# Patient Record
Sex: MALE | Race: OTHER | URBAN - METROPOLITAN AREA
[De-identification: names, ages, dates, MRNs, and addresses within clinical notes are randomized per-mention and may not be internally consistent; named-entity substitution may affect disease eponyms.]

---

## 2023-12-12 ENCOUNTER — EMERGENCY (EMERGENCY)
Facility: HOSPITAL | Age: 28
LOS: 1 days | Discharge: HOME | End: 2023-12-12
Attending: STUDENT IN AN ORGANIZED HEALTH CARE EDUCATION/TRAINING PROGRAM | Admitting: STUDENT IN AN ORGANIZED HEALTH CARE EDUCATION/TRAINING PROGRAM
Payer: COMMERCIAL

## 2023-12-12 VITALS
OXYGEN SATURATION: 97 % | RESPIRATION RATE: 18 BRPM | DIASTOLIC BLOOD PRESSURE: 93 MMHG | TEMPERATURE: 98 F | SYSTOLIC BLOOD PRESSURE: 162 MMHG | HEIGHT: 68 IN | HEART RATE: 74 BPM | WEIGHT: 169.98 LBS

## 2023-12-12 VITALS
HEART RATE: 66 BPM | DIASTOLIC BLOOD PRESSURE: 89 MMHG | OXYGEN SATURATION: 96 % | SYSTOLIC BLOOD PRESSURE: 140 MMHG | TEMPERATURE: 98 F | RESPIRATION RATE: 18 BRPM

## 2023-12-12 DIAGNOSIS — Z87.2 PERSONAL HISTORY OF DISEASES OF THE SKIN AND SUBCUTANEOUS TISSUE: ICD-10-CM

## 2023-12-12 DIAGNOSIS — R41.82 ALTERED MENTAL STATUS, UNSPECIFIED: ICD-10-CM

## 2023-12-12 DIAGNOSIS — R22.0 LOCALIZED SWELLING, MASS AND LUMP, HEAD: ICD-10-CM
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
## 2023-12-12 LAB
ALBUMIN SERPL ELPH-MCNC: 4.6 G/DL — SIGNIFICANT CHANGE UP (ref 3.3–5)
ALBUMIN SERPL ELPH-MCNC: 4.6 G/DL — SIGNIFICANT CHANGE UP (ref 3.3–5)
ALP SERPL-CCNC: 72 U/L — SIGNIFICANT CHANGE UP (ref 40–120)
ALP SERPL-CCNC: 72 U/L — SIGNIFICANT CHANGE UP (ref 40–120)
ALT FLD-CCNC: 64 U/L — HIGH (ref 10–45)
ALT FLD-CCNC: 64 U/L — HIGH (ref 10–45)
ANION GAP SERPL CALC-SCNC: 10 MMOL/L — SIGNIFICANT CHANGE UP (ref 5–17)
ANION GAP SERPL CALC-SCNC: 10 MMOL/L — SIGNIFICANT CHANGE UP (ref 5–17)
APAP SERPL-MCNC: <5 UG/ML — LOW (ref 10–30)
APAP SERPL-MCNC: <5 UG/ML — LOW (ref 10–30)
APTT BLD: 35.5 SEC — SIGNIFICANT CHANGE UP (ref 24.5–35.6)
APTT BLD: 35.5 SEC — SIGNIFICANT CHANGE UP (ref 24.5–35.6)
AST SERPL-CCNC: 39 U/L — SIGNIFICANT CHANGE UP (ref 10–40)
AST SERPL-CCNC: 39 U/L — SIGNIFICANT CHANGE UP (ref 10–40)
BASOPHILS # BLD AUTO: 0.03 K/UL — SIGNIFICANT CHANGE UP (ref 0–0.2)
BASOPHILS # BLD AUTO: 0.03 K/UL — SIGNIFICANT CHANGE UP (ref 0–0.2)
BASOPHILS NFR BLD AUTO: 0.5 % — SIGNIFICANT CHANGE UP (ref 0–2)
BASOPHILS NFR BLD AUTO: 0.5 % — SIGNIFICANT CHANGE UP (ref 0–2)
BILIRUB SERPL-MCNC: 0.4 MG/DL — SIGNIFICANT CHANGE UP (ref 0.2–1.2)
BILIRUB SERPL-MCNC: 0.4 MG/DL — SIGNIFICANT CHANGE UP (ref 0.2–1.2)
BUN SERPL-MCNC: 16 MG/DL — SIGNIFICANT CHANGE UP (ref 7–23)
BUN SERPL-MCNC: 16 MG/DL — SIGNIFICANT CHANGE UP (ref 7–23)
CALCIUM SERPL-MCNC: 9.4 MG/DL — SIGNIFICANT CHANGE UP (ref 8.4–10.5)
CALCIUM SERPL-MCNC: 9.4 MG/DL — SIGNIFICANT CHANGE UP (ref 8.4–10.5)
CHLORIDE SERPL-SCNC: 105 MMOL/L — SIGNIFICANT CHANGE UP (ref 96–108)
CHLORIDE SERPL-SCNC: 105 MMOL/L — SIGNIFICANT CHANGE UP (ref 96–108)
CO2 SERPL-SCNC: 24 MMOL/L — SIGNIFICANT CHANGE UP (ref 22–31)
CO2 SERPL-SCNC: 24 MMOL/L — SIGNIFICANT CHANGE UP (ref 22–31)
CREAT SERPL-MCNC: 0.99 MG/DL — SIGNIFICANT CHANGE UP (ref 0.5–1.3)
CREAT SERPL-MCNC: 0.99 MG/DL — SIGNIFICANT CHANGE UP (ref 0.5–1.3)
EGFR: 106 ML/MIN/1.73M2 — SIGNIFICANT CHANGE UP
EGFR: 106 ML/MIN/1.73M2 — SIGNIFICANT CHANGE UP
EOSINOPHIL # BLD AUTO: 0.16 K/UL — SIGNIFICANT CHANGE UP (ref 0–0.5)
EOSINOPHIL # BLD AUTO: 0.16 K/UL — SIGNIFICANT CHANGE UP (ref 0–0.5)
EOSINOPHIL NFR BLD AUTO: 2.6 % — SIGNIFICANT CHANGE UP (ref 0–6)
EOSINOPHIL NFR BLD AUTO: 2.6 % — SIGNIFICANT CHANGE UP (ref 0–6)
ETHANOL SERPL-MCNC: <10 MG/DL — SIGNIFICANT CHANGE UP (ref 0–10)
ETHANOL SERPL-MCNC: <10 MG/DL — SIGNIFICANT CHANGE UP (ref 0–10)
GLUCOSE SERPL-MCNC: 103 MG/DL — HIGH (ref 70–99)
GLUCOSE SERPL-MCNC: 103 MG/DL — HIGH (ref 70–99)
HCT VFR BLD CALC: 44.8 % — SIGNIFICANT CHANGE UP (ref 39–50)
HCT VFR BLD CALC: 44.8 % — SIGNIFICANT CHANGE UP (ref 39–50)
HGB BLD-MCNC: 15.5 G/DL — SIGNIFICANT CHANGE UP (ref 13–17)
HGB BLD-MCNC: 15.5 G/DL — SIGNIFICANT CHANGE UP (ref 13–17)
IMM GRANULOCYTES NFR BLD AUTO: 0.3 % — SIGNIFICANT CHANGE UP (ref 0–0.9)
IMM GRANULOCYTES NFR BLD AUTO: 0.3 % — SIGNIFICANT CHANGE UP (ref 0–0.9)
INR BLD: 1.02 — SIGNIFICANT CHANGE UP (ref 0.85–1.18)
INR BLD: 1.02 — SIGNIFICANT CHANGE UP (ref 0.85–1.18)
LYMPHOCYTES # BLD AUTO: 1.56 K/UL — SIGNIFICANT CHANGE UP (ref 1–3.3)
LYMPHOCYTES # BLD AUTO: 1.56 K/UL — SIGNIFICANT CHANGE UP (ref 1–3.3)
LYMPHOCYTES # BLD AUTO: 25.5 % — SIGNIFICANT CHANGE UP (ref 13–44)
LYMPHOCYTES # BLD AUTO: 25.5 % — SIGNIFICANT CHANGE UP (ref 13–44)
MCHC RBC-ENTMCNC: 29.5 PG — SIGNIFICANT CHANGE UP (ref 27–34)
MCHC RBC-ENTMCNC: 29.5 PG — SIGNIFICANT CHANGE UP (ref 27–34)
MCHC RBC-ENTMCNC: 34.6 GM/DL — SIGNIFICANT CHANGE UP (ref 32–36)
MCHC RBC-ENTMCNC: 34.6 GM/DL — SIGNIFICANT CHANGE UP (ref 32–36)
MCV RBC AUTO: 85.2 FL — SIGNIFICANT CHANGE UP (ref 80–100)
MCV RBC AUTO: 85.2 FL — SIGNIFICANT CHANGE UP (ref 80–100)
MONOCYTES # BLD AUTO: 0.46 K/UL — SIGNIFICANT CHANGE UP (ref 0–0.9)
MONOCYTES # BLD AUTO: 0.46 K/UL — SIGNIFICANT CHANGE UP (ref 0–0.9)
MONOCYTES NFR BLD AUTO: 7.5 % — SIGNIFICANT CHANGE UP (ref 2–14)
MONOCYTES NFR BLD AUTO: 7.5 % — SIGNIFICANT CHANGE UP (ref 2–14)
NEUTROPHILS # BLD AUTO: 3.89 K/UL — SIGNIFICANT CHANGE UP (ref 1.8–7.4)
NEUTROPHILS # BLD AUTO: 3.89 K/UL — SIGNIFICANT CHANGE UP (ref 1.8–7.4)
NEUTROPHILS NFR BLD AUTO: 63.6 % — SIGNIFICANT CHANGE UP (ref 43–77)
NEUTROPHILS NFR BLD AUTO: 63.6 % — SIGNIFICANT CHANGE UP (ref 43–77)
NRBC # BLD: 0 /100 WBCS — SIGNIFICANT CHANGE UP (ref 0–0)
NRBC # BLD: 0 /100 WBCS — SIGNIFICANT CHANGE UP (ref 0–0)
PLATELET # BLD AUTO: 206 K/UL — SIGNIFICANT CHANGE UP (ref 150–400)
PLATELET # BLD AUTO: 206 K/UL — SIGNIFICANT CHANGE UP (ref 150–400)
POTASSIUM SERPL-MCNC: 4.1 MMOL/L — SIGNIFICANT CHANGE UP (ref 3.5–5.3)
POTASSIUM SERPL-MCNC: 4.1 MMOL/L — SIGNIFICANT CHANGE UP (ref 3.5–5.3)
POTASSIUM SERPL-SCNC: 4.1 MMOL/L — SIGNIFICANT CHANGE UP (ref 3.5–5.3)
POTASSIUM SERPL-SCNC: 4.1 MMOL/L — SIGNIFICANT CHANGE UP (ref 3.5–5.3)
PROT SERPL-MCNC: 7.5 G/DL — SIGNIFICANT CHANGE UP (ref 6–8.3)
PROT SERPL-MCNC: 7.5 G/DL — SIGNIFICANT CHANGE UP (ref 6–8.3)
PROTHROM AB SERPL-ACNC: 11.6 SEC — SIGNIFICANT CHANGE UP (ref 9.5–13)
PROTHROM AB SERPL-ACNC: 11.6 SEC — SIGNIFICANT CHANGE UP (ref 9.5–13)
RBC # BLD: 5.26 M/UL — SIGNIFICANT CHANGE UP (ref 4.2–5.8)
RBC # BLD: 5.26 M/UL — SIGNIFICANT CHANGE UP (ref 4.2–5.8)
RBC # FLD: 12.1 % — SIGNIFICANT CHANGE UP (ref 10.3–14.5)
RBC # FLD: 12.1 % — SIGNIFICANT CHANGE UP (ref 10.3–14.5)
SALICYLATES SERPL-MCNC: <0.3 MG/DL — LOW (ref 2.8–20)
SALICYLATES SERPL-MCNC: <0.3 MG/DL — LOW (ref 2.8–20)
SODIUM SERPL-SCNC: 139 MMOL/L — SIGNIFICANT CHANGE UP (ref 135–145)
SODIUM SERPL-SCNC: 139 MMOL/L — SIGNIFICANT CHANGE UP (ref 135–145)
TROPONIN T, HIGH SENSITIVITY RESULT: <6 NG/L — SIGNIFICANT CHANGE UP (ref 0–51)
TROPONIN T, HIGH SENSITIVITY RESULT: <6 NG/L — SIGNIFICANT CHANGE UP (ref 0–51)
WBC # BLD: 6.12 K/UL — SIGNIFICANT CHANGE UP (ref 3.8–10.5)
WBC # BLD: 6.12 K/UL — SIGNIFICANT CHANGE UP (ref 3.8–10.5)
WBC # FLD AUTO: 6.12 K/UL — SIGNIFICANT CHANGE UP (ref 3.8–10.5)
WBC # FLD AUTO: 6.12 K/UL — SIGNIFICANT CHANGE UP (ref 3.8–10.5)

## 2023-12-12 PROCEDURE — 70450 CT HEAD/BRAIN W/O DYE: CPT | Mod: MA

## 2023-12-12 PROCEDURE — 70486 CT MAXILLOFACIAL W/O DYE: CPT | Mod: MA

## 2023-12-12 PROCEDURE — 70450 CT HEAD/BRAIN W/O DYE: CPT | Mod: 26,MA,59

## 2023-12-12 PROCEDURE — 70498 CT ANGIOGRAPHY NECK: CPT | Mod: MA

## 2023-12-12 PROCEDURE — 85025 COMPLETE CBC W/AUTO DIFF WBC: CPT

## 2023-12-12 PROCEDURE — 70496 CT ANGIOGRAPHY HEAD: CPT | Mod: MA

## 2023-12-12 PROCEDURE — 0042T: CPT | Mod: MA

## 2023-12-12 PROCEDURE — 70486 CT MAXILLOFACIAL W/O DYE: CPT | Mod: 26,MA

## 2023-12-12 PROCEDURE — 80053 COMPREHEN METABOLIC PANEL: CPT

## 2023-12-12 PROCEDURE — 70496 CT ANGIOGRAPHY HEAD: CPT | Mod: 26,MA

## 2023-12-12 PROCEDURE — 85730 THROMBOPLASTIN TIME PARTIAL: CPT

## 2023-12-12 PROCEDURE — 36415 COLL VENOUS BLD VENIPUNCTURE: CPT

## 2023-12-12 PROCEDURE — 85610 PROTHROMBIN TIME: CPT

## 2023-12-12 PROCEDURE — 80307 DRUG TEST PRSMV CHEM ANLYZR: CPT

## 2023-12-12 PROCEDURE — 82962 GLUCOSE BLOOD TEST: CPT

## 2023-12-12 PROCEDURE — 99285 EMERGENCY DEPT VISIT HI MDM: CPT | Mod: 25

## 2023-12-12 PROCEDURE — 99285 EMERGENCY DEPT VISIT HI MDM: CPT

## 2023-12-12 PROCEDURE — 93005 ELECTROCARDIOGRAM TRACING: CPT

## 2023-12-12 PROCEDURE — 70498 CT ANGIOGRAPHY NECK: CPT | Mod: 26,MA

## 2023-12-12 PROCEDURE — 93010 ELECTROCARDIOGRAM REPORT: CPT

## 2023-12-12 PROCEDURE — 84484 ASSAY OF TROPONIN QUANT: CPT

## 2023-12-12 NOTE — ED PROVIDER NOTE - CLINICAL SUMMARY MEDICAL DECISION MAKING FREE TEXT BOX
stroke code called for AMS. stroke imaging negative. after ct mental status imrpoving but still mildly confused, which gradually improves throoughot ED stay. stroke imaging negative. epilepsy consulted for eval for possible post ictal state. no infecitous signs, patient requests discharge prior to epilepsy eval as asymptomatic at this time. return precautions given.

## 2023-12-12 NOTE — ED ADULT NURSE NOTE - CHIEF COMPLAINT QUOTE
29 y/o M presents to the ED as a walk in s/p fall with epistaxis. In triage, confused, pt unable to state name, date of birth, where he is, or what happened. C/o right facial pressure and difficulty seeing anything. Stroke code immediately activated and presented to MD Pineda. . Pt endorsed to MICKI Mccollum.

## 2023-12-12 NOTE — ED PROVIDER NOTE - PATIENT PORTAL LINK FT
You can access the FollowMyHealth Patient Portal offered by [facility] by registering at the following website: [email or web address]. By joining [facility]’s FollowMyHealth portal, you will also be able to view your health information using other applications (apps) compatible with our system. You can access the FollowMyHealth Patient Portal offered by [facility] by registering at the following website: [email or web address]. By joining [employer]’s FollowMyHealth portal, you will also be able to view your health information using other applications (apps) compatible with our system.

## 2023-12-12 NOTE — ED ADULT TRIAGE NOTE - CHIEF COMPLAINT QUOTE
27 y/o M presents to the ED as a walk in s/p fall with epistaxis. In triage, confused, pt unable to state name, date of birth, where he is, or what happened. C/o right facial pressure and difficulty seeing anything. Stroke code immediately activated and presented to MD Pineda. . Pt endorsed to MICKI Mccollum.

## 2023-12-12 NOTE — ED ADULT NURSE REASSESSMENT NOTE - NS ED NURSE REASSESS COMMENT FT1
Pt noted to ambulate with a steady gait and even balance. Pt denies any dizziness and cleared for discharge. Pt is AxOx4.

## 2023-12-12 NOTE — CONSULT NOTE ADULT - SUBJECTIVE AND OBJECTIVE BOX
**STROKE CODE CONSULT NOTE**    Last known well time: 12/12 today unclear time   HPI: 28y Male with PMHx of psoriasis presents to [facility] ED for fall and confusion. Pt was at work, went to bathroom and cannot recall rest of the events. Pt was found on the ground in the bathroom by his coworkers with a bloody nose and pt was confused. Stroke code called on arrival, NIHSS 1 due to confusion. Patient at triage was initially unsure of his name, where he was, what he was doing. Pt gradually improved in mental status but still did not return to baseline, still unable to recall events just prior to fall. Pt c/o "weird" sensation behind eyes and on right side of the head but denies headache. CT imaging negative for acute intracranial pathology. Pt denies weakness/numbness, diplopia, speech disturbances, n/v, unsteady gait, dizziness.     T(C): 36.7 (12-12-23 @ 13:40), Max: 36.7 (12-12-23 @ 13:40)  HR: 80 (12-12-23 @ 14:17) (74 - 80)  BP: 155/90 (12-12-23 @ 14:17) (155/90 - 162/93)  RR: 18 (12-12-23 @ 14:17) (18 - 18)  SpO2: 98% (12-12-23 @ 14:17) (97% - 98%)    PAST MEDICAL & SURGICAL HISTORY:    FAMILY HISTORY:    SOCIAL HISTORY:   Patient lives with *** at ***.   Smoking status:  Drinking:  Drug Use:     ROS:  Constitutional: No fever, weight loss or fatigue  Eyes: No eye pain, visual disturbances, or discharge  ENMT:  No difficulty hearing, tinnitus; No sinus or throat pain  Neck: No pain or stiffness  Respiratory: No cough, wheezing, chills or hemoptysis  Cardiovascular: No chest pain, palpitations, shortness of breath, or leg swelling  Gastrointestinal: No abdominal pain. No nausea, vomiting   Genitourinary: No dysuria, frequency, hematuria or incontinence  Neurological: As per HPI      MEDICATIONS  (STANDING):    MEDICATIONS  (PRN):    Allergies    No Known Allergies    Intolerances    Vital Signs Last 24 Hrs  T(C): 36.7 (12 Dec 2023 13:40), Max: 36.7 (12 Dec 2023 13:40)  T(F): 98 (12 Dec 2023 13:40), Max: 98 (12 Dec 2023 13:40)  HR: 80 (12 Dec 2023 14:17) (74 - 80)  BP: 155/90 (12 Dec 2023 14:17) (155/90 - 162/93)  BP(mean): --  RR: 18 (12 Dec 2023 14:17) (18 - 18)  SpO2: 98% (12 Dec 2023 14:17) (97% - 98%)    Parameters below as of 12 Dec 2023 14:17  Patient On (Oxygen Delivery Method): room air    Physical exam:  Constitutional: No acute distress, conversant  Eyes: Anicteric sclerae, moist conjunctivae, see below for CNs  Neck: trachea midline  Cardiovascular: Regular rate and rhythm  Pulmonary: No use of accessory muscles    Neurologic:  -Mental status: Awake, oriented to person, place, not to time. Speech is fluent with with rare paucities, intact naming, repetition, and comprehension, no dysarthria. Recent and remote memory intact. Follows commands. Attention/concentration intact. Fund of knowledge appropriate.  -Cranial nerves:   II: Visual fields are full to confrontation.  III, IV, VI: Extraocular movements are intact without nystagmus. Pupils equally round and reactive to light  V:  Facial sensation V1-V3 equal and intact   VII: Face is symmetric with normal eye closure and smile  VIII: Hearing is bilaterally intact to finger rub  IX, X: Uvula is midline and soft palate rises symmetrically  XI: Head turning and shoulder shrug are intact.  XII: Tongue protrudes midline  Motor: Normal bulk and tone. No pronator drift. Strength bilateral upper extremity 5/5, bilateral lower extremities 5/5.  Rapid alternating movements intact and symmetric  Sensation: Intact to light touch bilaterally. No neglect or extinction on double simultaneous testing.  Coordination: No dysmetria on finger-to-nose and heel-to-shin bilaterally  Reflexes: Downgoing toes bilaterally   Gait: Narrow gait and steady    NIHSS: 1    Fingerstick Blood Glucose: CAPILLARY BLOOD GLUCOSE  107 (12 Dec 2023 15:49)      POCT Blood Glucose.: 107 mg/dL (12 Dec 2023 13:45)    LABS:                        15.5   6.12  )-----------( 206      ( 12 Dec 2023 14:02 )             44.8     12-12    139  |  105  |  16  ----------------------------<  103<H>  4.1   |  24  |  0.99    Ca    9.4      12 Dec 2023 14:02    TPro  7.5  /  Alb  4.6  /  TBili  0.4  /  DBili  x   /  AST  39  /  ALT  64<H>  /  AlkPhos  72  12-12    PT/INR - ( 12 Dec 2023 14:02 )   PT: 11.6 sec;   INR: 1.02          PTT - ( 12 Dec 2023 14:02 )  PTT:35.5 sec      Urinalysis Basic - ( 12 Dec 2023 14:02 )    Color: x / Appearance: x / SG: x / pH: x  Gluc: 103 mg/dL / Ketone: x  / Bili: x / Urobili: x   Blood: x / Protein: x / Nitrite: x   Leuk Esterase: x / RBC: x / WBC x   Sq Epi: x / Non Sq Epi: x / Bacteria: x    RADIOLOGY & ADDITIONAL STUDIES:    < from: CT Brain Stroke Protocol (12.12.23 @ 14:04) >  IMPRESSION:    CT Head: No acute intracranial hemorrhage, mass effect or demarcated   territorial infarction..    CT Perfusion: Normal CT perfusion    Intracranial CTA:No large vessel occlusion or high-grade stenosis    Extracranial CTA: No significant steno-occlusive disease    -----------------------------------------------------------------------------------------------------------------  IV-tPA (Y/N): N                             Bolus time:    Tenecteplase Dose Verification w/ RN:  Reason IV-tPA not given: no focal deficits     **STROKE CODE CONSULT NOTE**    Last known well time: 12/12 today unclear time   HPI: 28y Male with PMHx of psoriasis presents to [facility] ED for fall and confusion. Pt was at work, went to bathroom and cannot recall rest of the events. Pt was found on the ground in the bathroom by his coworkers with a bloody nose and pt was confused. Stroke code called on arrival, NIHSS 1 due to confusion. Patient at triage was initially unsure of his name, where he was, what he was doing. Pt gradually improved in mental status but still did not return to baseline, still unable to recall events just prior to fall. Pt c/o "weird" sensation behind eyes and on right side of the head but denies headache. CT imaging negative for acute intracranial pathology. Pt denies weakness/numbness, diplopia, speech disturbances, n/v, unsteady gait, dizziness.     T(C): 36.7 (12-12-23 @ 13:40), Max: 36.7 (12-12-23 @ 13:40)  HR: 80 (12-12-23 @ 14:17) (74 - 80)  BP: 155/90 (12-12-23 @ 14:17) (155/90 - 162/93)  RR: 18 (12-12-23 @ 14:17) (18 - 18)  SpO2: 98% (12-12-23 @ 14:17) (97% - 98%)    PAST MEDICAL & SURGICAL HISTORY:    FAMILY HISTORY:    SOCIAL HISTORY:   Patient lives with *** at ***.   Smoking status:  Drinking:  Drug Use:     ROS:  Constitutional: No fever, weight loss or fatigue  Eyes: No eye pain, visual disturbances, or discharge  ENMT:  No difficulty hearing, tinnitus; No sinus or throat pain  Neck: No pain or stiffness  Respiratory: No cough, wheezing, chills or hemoptysis  Cardiovascular: No chest pain, palpitations, shortness of breath, or leg swelling  Gastrointestinal: No abdominal pain. No nausea, vomiting   Genitourinary: No dysuria, frequency, hematuria or incontinence  Neurological: As per HPI      MEDICATIONS  (STANDING):    MEDICATIONS  (PRN):    Allergies    No Known Allergies    Intolerances    Vital Signs Last 24 Hrs  T(C): 36.7 (12 Dec 2023 13:40), Max: 36.7 (12 Dec 2023 13:40)  T(F): 98 (12 Dec 2023 13:40), Max: 98 (12 Dec 2023 13:40)  HR: 80 (12 Dec 2023 14:17) (74 - 80)  BP: 155/90 (12 Dec 2023 14:17) (155/90 - 162/93)  BP(mean): --  RR: 18 (12 Dec 2023 14:17) (18 - 18)  SpO2: 98% (12 Dec 2023 14:17) (97% - 98%)    Parameters below as of 12 Dec 2023 14:17  Patient On (Oxygen Delivery Method): room air    Physical exam:  Constitutional: No acute distress, conversant  Eyes: Anicteric sclerae, moist conjunctivae, see below for CNs  Neck: trachea midline  Cardiovascular: Regular rate and rhythm  Pulmonary: No use of accessory muscles    Neurologic:  -Mental status: Awake, oriented to person, place, not to time. Speech is fluent with with rare paucities, intact naming, repetition, and comprehension, no dysarthria. Recent and remote memory intact. Follows commands. Attention/concentration intact. Fund of knowledge appropriate.  -Cranial nerves:   II: Visual fields are full to confrontation.  III, IV, VI: Extraocular movements are intact without nystagmus. Pupils equally round and reactive to light  V:  Facial sensation V1-V3 equal and intact   VII: Face is symmetric with normal eye closure and smile  VIII: Hearing is bilaterally intact to finger rub  IX, X: Uvula is midline and soft palate rises symmetrically  XI: Head turning and shoulder shrug are intact.  XII: Tongue protrudes midline  Motor: Normal bulk and tone. No pronator drift. Strength bilateral upper extremity 5/5, bilateral lower extremities 5/5.  Rapid alternating movements intact and symmetric  Sensation: Intact to light touch bilaterally. No neglect or extinction on double simultaneous testing.  Coordination: No dysmetria on finger-to-nose and heel-to-shin bilaterally  Reflexes: Downgoing toes bilaterally   Gait: Narrow gait and steady    NIHSS: 1    Fingerstick Blood Glucose: CAPILLARY BLOOD GLUCOSE  107 (12 Dec 2023 15:49)      POCT Blood Glucose.: 107 mg/dL (12 Dec 2023 13:45)    LABS:                        15.5   6.12  )-----------( 206      ( 12 Dec 2023 14:02 )             44.8     12-12    139  |  105  |  16  ----------------------------<  103<H>  4.1   |  24  |  0.99    Ca    9.4      12 Dec 2023 14:02    TPro  7.5  /  Alb  4.6  /  TBili  0.4  /  DBili  x   /  AST  39  /  ALT  64<H>  /  AlkPhos  72  12-12    PT/INR - ( 12 Dec 2023 14:02 )   PT: 11.6 sec;   INR: 1.02          PTT - ( 12 Dec 2023 14:02 )  PTT:35.5 sec      Urinalysis Basic - ( 12 Dec 2023 14:02 )    Color: x / Appearance: x / SG: x / pH: x  Gluc: 103 mg/dL / Ketone: x  / Bili: x / Urobili: x   Blood: x / Protein: x / Nitrite: x   Leuk Esterase: x / RBC: x / WBC x   Sq Epi: x / Non Sq Epi: x / Bacteria: x    RADIOLOGY & ADDITIONAL STUDIES:    < from: CT Brain Stroke Protocol (12.12.23 @ 14:04) >  IMPRESSION:    CT Head: No acute intracranial hemorrhage, mass effect or demarcated   territorial infarction..    CT Perfusion: Normal CT perfusion    Intracranial CTA:No large vessel occlusion or high-grade stenosis    Extracranial CTA: No significant steno-occlusive disease    -----------------------------------------------------------------------------------------------------------------  IV-tPA (Y/N): N                             Bolus time:    Tenecteplase Dose Verification w/ RN:  Reason IV-tPA not given: no focal deficits

## 2023-12-12 NOTE — ED PROVIDER NOTE - CARE PROVIDERS DIRECT ADDRESSES
,[email or web address]

## 2023-12-12 NOTE — CONSULT NOTE ADULT - ASSESSMENT
28y Male with PMHx of psoriasis presents to [facility] ED for fall and confusion. Pt was at work, went to bathroom and cannot recall rest of the events. Pt was found on the ground in the bathroom by his coworkers with a bloody nose and pt was confused. Stroke code called on arrival, NIHSS 1 due to confusion. Patient at triage was initially unsure of his name, where he was, what he was doing. Pt gradually improved in mental status but still did not return to baseline, still unable to recall events just prior to fall. Pt c/o "weird" sensation behind eyes and on right side of the head but denies headache. CT imaging negative for acute intracranial pathology.     Unclear cause of ?syncope however cannot exclude seizure from differential. Patient's presentation may be consistent with post-ictal state. Low concern for ischemic cause of symptoms given no focal neurologic deficits.     Recommend:  - gen neuro/epilepsy evaluation  - toxic-metabolic and infectious workup  - no further stroke workup warranted at this time.     Case discussed with Neurology Attending Dr. Caldwell    28y Male with PMHx of psoriasis presents to [facility] ED for fall and confusion. Pt was at work, went to bathroom and cannot recall rest of the events. Pt was found on the ground in the bathroom by his coworkers with a bloody nose and pt was confused. Stroke code called on arrival, NIHSS 1 due to confusion. Patient at triage was initially unsure of his name, where he was, what he was doing. Pt gradually improved in mental status but still did not return to baseline, still unable to recall events just prior to fall. Pt c/o "weird" sensation behind eyes and on right side of the head but denies headache. CT imaging negative for acute intracranial pathology.     Unclear cause of ?syncope however cannot exclude seizure from differential. Patient's presentation may be consistent with post-ictal state. Low concern for ischemic cause of symptoms given no focal neurologic deficits.     Recommend:  - gen neuro/epilepsy evaluation  - toxic-metabolic and infectious workup  - no further stroke workup warranted at this time.     Case discussed with Neurology Attending Dr. Caldwell

## 2023-12-12 NOTE — ED PROVIDER NOTE - OBJECTIVE STATEMENT
28m hx psoriasis. patient mildly confused, unsure of events that led to ED presentation. believes he was at work in the bathroom and then woke up on the bathroom floor with a bloody nose. denies drug use, etoh use.

## 2023-12-12 NOTE — ED PROVIDER NOTE - CARE PROVIDER_API CALL
Rafa Sandoval  Neurology  [address] [address]  Phone: [phone]  Fax: [phone]  Follow Up Time:    Rafa Sandoval  Neurology  [address] [address]  Phone: [phone]  Fax: [phone]  Follow Up Time:

## 2023-12-12 NOTE — ED ADULT NURSE NOTE - NSFALLRISKINTERV_ED_ALL_ED
Communicate fall risk and risk factors to all staff, patient, and family/Provide visual cue: yellow wristband, yellow gown, etc/Reinforce activity limits and safety measures with patient and family/Call bell, personal items and telephone in reach/Instruct patient to call for assistance before getting out of bed/chair/stretcher/Non-slip footwear applied when patient is off stretcher/[place] to call system/Physically safe environment - no spills, clutter or unnecessary equipment/Purposeful Proactive Rounding/Room/bathroom lighting operational, light cord in reach Communicate fall risk and risk factors to all staff, patient, and family/Provide visual cue: yellow wristband, yellow gown, etc/Reinforce activity limits and safety measures with patient and family/Call bell, personal items and telephone in reach/Instruct patient to call for assistance before getting out of bed/chair/stretcher/Non-slip footwear applied when patient is off stretcher/[place] to call system/Physically safe environment - no spills, clutter or unnecessary equipment/Purposeful Proactive Rounding/Room/bathroom lighting operational, light cord in reach

## 2023-12-12 NOTE — ED PROVIDER NOTE - PHYSICAL EXAMINATION
General: Awake, alert. Appears stated age.  Skin: Skin in warm, dry and intact without rashes or lesions. Appropriate color for ethnicity  HENMT: minimal swelling to bridghe of nose and right eyebrow with mild ttp in these areas; bilateral external ears without swelling. no nasal discharge. moist oral mucosa. supple neck, trachea midline  EYES: Conjunctiva clear. nonicteric sclera. EOM intact, Eyelids are normal in appearance without swelling or lesions, no nystagmus  Cardiac: well perfused, s1, s2, rrr  Respiratory: breathing comfortably on room air. no audible wheezing or stridor  Abdominal: nondistended  MSK: Neck and back are without deformity, visible external skin changes, or signs of trauma. Curvature of the cervical, thoracic, and lumbar spine are within normal limits. no external signs of trauma. no apparent deficits in ROM of any extremity  Neurological: The patient is awake, alert. able to recall personal details such as name and address however with delay. CN 2-12 intact. following commands, 5/5 strength and intact and equal sensation in all 4 extremities. no apparent deficits. does not remember the events of the day.   Psychiatric: Appropriate mood and affect. Good judgement and insight. No visual or auditory hallucinations.

## 2023-12-12 NOTE — ED ADULT NURSE NOTE - OBJECTIVE STATEMENT
Pt is a 28 year old male that  presents ambulatory to triage after experiencing a fall at work while on break. Pt reports epistaxis after falling. Pt is alert but disoriented to date and the situation surrounding his fall. Pt does not remember what time the fall was at. LKW unknown. BG 17 on arrival. Pt initial NIHSS 1 for LOC questions. All other neurological functions intact. Pt following commands appropriately. Pt noted to ambulate independently with stead gait.

## 2023-12-12 NOTE — ED ADULT TRIAGE NOTE - BMI (KG/M2)
"-- Message is from the [facility]--    Order Request  Lab: [address] / reason: Gallbladder issue                Preferred Delivery Method   Fax - number to send to: [phone]     [email or web address] Phone    08/08/2019 10:25 AM Phone (Incoming) KATJA (Provider) [phone]          Alternative phone number: Na    Turnaround time given to caller: ""This message will be sent to [facility] Provider's name]. The clinical team will fulfill your request as soon as they review your message. \""  " 25.8

## 2023-12-13 ENCOUNTER — NON-APPOINTMENT (OUTPATIENT)
Age: 28
End: 2023-12-13

## 2023-12-13 PROBLEM — Z00.00 ENCOUNTER FOR PREVENTIVE HEALTH EXAMINATION: Status: ACTIVE | Noted: 2023-12-13
